# Patient Record
Sex: MALE | Race: BLACK OR AFRICAN AMERICAN | NOT HISPANIC OR LATINO | Employment: FULL TIME | URBAN - METROPOLITAN AREA
[De-identification: names, ages, dates, MRNs, and addresses within clinical notes are randomized per-mention and may not be internally consistent; named-entity substitution may affect disease eponyms.]

---

## 2022-05-25 DIAGNOSIS — R20.2 PARESTHESIA: Primary | ICD-10-CM

## 2023-09-10 ENCOUNTER — HOSPITAL ENCOUNTER (EMERGENCY)
Facility: HOSPITAL | Age: 28
Discharge: HOME | End: 2023-09-10
Attending: INTERNAL MEDICINE

## 2023-09-10 VITALS
DIASTOLIC BLOOD PRESSURE: 98 MMHG | OXYGEN SATURATION: 98 % | SYSTOLIC BLOOD PRESSURE: 157 MMHG | BODY MASS INDEX: 30.78 KG/M2 | HEART RATE: 65 BPM | TEMPERATURE: 98 F | RESPIRATION RATE: 14 BRPM | WEIGHT: 215 LBS | HEIGHT: 70 IN

## 2023-09-10 DIAGNOSIS — N34.2 URETHRITIS: Primary | ICD-10-CM

## 2023-09-10 LAB
APPEARANCE UR: CLEAR
BACTERIA #/AREA URNS AUTO: NORMAL /HPF
BILIRUB UR QL STRIP.AUTO: NEGATIVE
C TRACH DNA SPEC QL NAA+PROBE: NOT DETECTED
COLOR UR: YELLOW
GLUCOSE UR QL STRIP.AUTO: NEGATIVE
KETONES UR QL STRIP.AUTO: NEGATIVE
LEUKOCYTE ESTERASE UR QL STRIP.AUTO: ABNORMAL
N GONORRHOEA DNA SPEC QL NAA+PROBE: DETECTED
NITRITE UR QL STRIP.AUTO: NEGATIVE
PH UR STRIP.AUTO: 8.5 [PH]
PROT UR QL STRIP.AUTO: NEGATIVE
RBC #/AREA URNS AUTO: NORMAL /HPF
RBC UR QL AUTO: NEGATIVE
SOURCE (OHS): ABNORMAL
SP GR UR STRIP.AUTO: 1.02 (ref 1–1.03)
SQUAMOUS #/AREA URNS AUTO: NORMAL /HPF
UROBILINOGEN UR STRIP-ACNC: 1
WBC #/AREA URNS AUTO: NORMAL /HPF

## 2023-09-10 PROCEDURE — 81001 URINALYSIS AUTO W/SCOPE: CPT | Performed by: INTERNAL MEDICINE

## 2023-09-10 PROCEDURE — 87491 CHLMYD TRACH DNA AMP PROBE: CPT | Performed by: INTERNAL MEDICINE

## 2023-09-10 PROCEDURE — 99284 EMERGENCY DEPT VISIT MOD MDM: CPT

## 2023-09-10 PROCEDURE — 87591 N.GONORRHOEAE DNA AMP PROB: CPT | Performed by: INTERNAL MEDICINE

## 2023-09-10 PROCEDURE — 96372 THER/PROPH/DIAG INJ SC/IM: CPT | Performed by: INTERNAL MEDICINE

## 2023-09-10 PROCEDURE — 63600175 PHARM REV CODE 636 W HCPCS: Performed by: INTERNAL MEDICINE

## 2023-09-10 RX ORDER — DOXYCYCLINE 100 MG/1
100 CAPSULE ORAL EVERY 12 HOURS
Qty: 10 CAPSULE | Refills: 0 | Status: SHIPPED | OUTPATIENT
Start: 2023-09-10 | End: 2023-09-20

## 2023-09-10 RX ORDER — CEFTRIAXONE 1 G/1
1 INJECTION, POWDER, FOR SOLUTION INTRAMUSCULAR; INTRAVENOUS
Status: COMPLETED | OUTPATIENT
Start: 2023-09-10 | End: 2023-09-10

## 2023-09-10 RX ORDER — DOXYCYCLINE 100 MG/1
100 CAPSULE ORAL EVERY 12 HOURS
Qty: 20 CAPSULE | Refills: 0 | Status: SHIPPED | OUTPATIENT
Start: 2023-09-10 | End: 2023-09-10 | Stop reason: SDUPTHER

## 2023-09-10 RX ADMIN — CEFTRIAXONE SODIUM 1 G: 1 INJECTION, POWDER, FOR SOLUTION INTRAMUSCULAR; INTRAVENOUS at 11:09

## 2023-09-10 NOTE — ED PROVIDER NOTES
Encounter Date: 9/10/2023  History from patient     History     Chief Complaint   Patient presents with    Exposure to STD     Possible  STD exposure      c/o penile discharge for 2 days     HPI    28 y.o. male  has no past medical history on file.  Exposure to STD (Possible  STD exposure      c/o penile discharge for 2 days)     Review of patient's allergies indicates:  No Known Allergies  No past medical history on file.  No past surgical history on file.  No family history on file.     Review of Systems   HENT:  Negative for trouble swallowing and voice change.    Eyes:  Negative for visual disturbance.   Respiratory:  Negative for cough and shortness of breath.    Cardiovascular:  Negative for chest pain.   Gastrointestinal:  Negative for abdominal pain, diarrhea and vomiting.   Genitourinary:  Positive for penile discharge. Negative for dysuria and hematuria.   Musculoskeletal:  Negative for gait problem.        No Pain.   Skin:  Negative for color change and rash.   Neurological:  Negative for headaches.   Psychiatric/Behavioral:  Negative for behavioral problems and sleep disturbance.    All other systems reviewed and are negative.      Physical Exam     Initial Vitals [09/10/23 1047]   BP Pulse Resp Temp SpO2   (!) 157/98 65 14 98 °F (36.7 °C) 98 %      MAP       --         Physical Exam    Nursing note and vitals reviewed.  Constitutional: He appears well-developed and well-nourished. No distress.   HENT:   Head: Atraumatic.   Eyes: EOM are normal.   Cardiovascular:  Normal rate, regular rhythm and normal heart sounds.           Pulmonary/Chest: Breath sounds normal.   Abdominal: Abdomen is soft. Bowel sounds are normal.   Musculoskeletal:         General: Normal range of motion.      Cervical back: No bony tenderness.     Neurological: He is alert.   Speech Normal   Skin: Skin is dry.   Psychiatric: He has a normal mood and affect.   Pleasant         ED Course   Procedures  Orders Placed This Encounter    Procedures    Chlamydia/GC, PCR    Urinalysis, Reflex to Urine Culture    Urinalysis, Microscopic     Medications   cefTRIAXone injection 1 g (1 g Intramuscular Given 9/10/23 1107)           Labs Reviewed   URINALYSIS, REFLEX TO URINE CULTURE - Abnormal; Notable for the following components:       Result Value    Leukocyte Esterase, UA Trace (*)     All other components within normal limits   URINALYSIS, MICROSCOPIC - Normal   CHLAMYDIA/GONORRHOEAE(GC), PCR          Imaging Results    None          Medications   cefTRIAXone injection 1 g (1 g Intramuscular Given 9/10/23 1107)     Medical Decision Making    28 y.o. male  has no past medical history on file.  Exposure to STD (Possible  STD exposure      c/o penile discharge for 2 days)         Amount and/or Complexity of Data Reviewed  Labs: ordered. Decision-making details documented in ED Course.    Risk  Prescription drug management.                               Clinical Impression:   Final diagnoses:  [N34.2] Urethritis (Primary)        ED Disposition Condition    Discharge Stable          ED Prescriptions       Medication Sig Dispense Start Date End Date Auth. Provider    doxycycline (VIBRAMYCIN) 100 MG Cap  (Status: Discontinued) Take 1 capsule (100 mg total) by mouth every 12 (twelve) hours. for 10 days 20 capsule 9/10/2023 9/10/2023 Bebo Peterson MD    doxycycline (VIBRAMYCIN) 100 MG Cap Take 1 capsule (100 mg total) by mouth every 12 (twelve) hours. for 10 days 10 capsule 9/10/2023 9/20/2023 Bebo Peterson MD          Follow-up Information       Follow up With Specialties Details Why Contact Info    PMD  In 2 days               Bebo Peterson MD  09/10/23 5344

## 2023-09-10 NOTE — DISCHARGE INSTRUCTIONS
Sexually Transmitted Disease  [address] [address]      Sexually transmitted disease (STD) refers to any infection that is passed from person to person during sexual activity. This may happen by way of saliva, semen, blood, vaginal mucus, or urine. Common STDs include:     Gonorrhea.      Chlamydia.      Syphilis.      HIV/AIDS.      Genital herpes.      Hepatitis B and C.      Trichomonas.      Human papillomavirus (HPV).      Pubic lice.      CAUSES   An STD may be spread by bacteria, virus, or parasite. A person can get an STD by:     Sexual intercourse with an infected person.      Sharing sex toys with an infected person.      Sharing needles with an infected person.      Having intimate contact with the genitals, mouth, or rectal areas of an infected person.      SYMPTOMS   Some people may not have any symptoms, but they can still pass the infection to others. Different STDs have different symptoms. Symptoms include:     Painful or bloody urination.      Pain in the pelvis, abdomen, vagina, anus, throat, or eyes.       Skin rash, itching, irritation, growths, or sores (lesions ). These usually occur in the genital or anal area.      Abnormal vaginal discharge.      Penile discharge in men.      Soft, flesh-colored skin growths in the genital or anal area.      Fever.      Pain or bleeding during sexual intercourse.      Swollen glands in the groin area.      Yellow skin and eyes (jaundice ). This is seen with hepatitis.      DIAGNOSIS   To make a diagnosis, your caregiver may:     Take a medical history.      Perform a physical exam.      Take a specimen (culture ) to be examined.      Examine a sample of discharge under a microscope.      Perform blood tests.      Perform a Pap test, if this applies.       Perform a colposcopy.       Perform a laparoscopy.      TREATMENT     Chlamydia, gonorrhea, trichomonas, and syphilis can be cured with antibiotic medicine.       Genital herpes, hepatitis, and HIV can be treated, but not cured, with prescribed medicines. The medicines will lessen the symptoms.       Genital warts from HPV can be treated with medicine or by freezing, burning (electrocautery ), or surgery. Warts may come back.       HPV is a virus and cannot be cured with medicine or surgery. However, abnormal areas may be followed very closely by your caregiver and may be removed from the cervix, vagina, or vulva through office procedures or surgery.    If your diagnosis is confirmed, your recent sexual partners need treatment. This is true even if they are symptom-free or have a negative culture or evaluation. They should not have sex until their caregiver says it is okay.     HOME CARE INSTRUCTIONS     All sexual partners should be informed, tested, and treated for all STDs.      Take your antibiotics as directed. Finish them even if you start to feel better.       Only take over-the-counter or prescription medicines for pain, discomfort, or fever as directed by your caregiver.       Rest.      Eat a balanced diet and drink enough fluids to keep your urine clear or pale yellow.      Do not have sex until treatment is completed and you have followed up with your caregiver. STDs should be checked after treatment.      Keep all follow-up appointments, Pap tests, and blood tests as directed by your caregiver.       Only use latex condoms and water-soluble lubricants during sexual activity. Do not use petroleum jelly or oils.      Avoid alcohol and illegal drugs.      Get vaccinated for HPV and hepatitis. If you have not received these vaccines in the past, talk to your caregiver about whether one or both might be right for you.      Avoid risky sex practices that can break the skin.    The only way to avoid getting an STD is to avoid all sexual activity. Latex condoms and dental dams (for oral sex) will help lessen the risk of getting an STD, but will not completely eliminate  the risk.     SEEK MEDICAL CARE IF:     You have a fever.      You have any new or worsening symptoms.      Document Released: 03/09/2004 Document Revised: 03/11/2013 Document Reviewed: 03/16/2012  [employer]® Patient Information ©2014 [employer].           Take medicines as prescribed    See your family doctor in one to 2 days for further evaluation, workup, and treatment as necessary    Avoid driving or operating machinery while taking medicines as some medicines might cause drowsiness and may cause problems. Also pain medicines have potential of being addictive  so use Pain meds specially Narcotics Sparingly.    The exam and treatment you received in Emergency Room was for an urgent problem and NOT INTENDED AS COMPLETE CARE. It is important that you FOLLOW UP with a doctor for ongoing care. If your symptoms become WORSE or you DO NOT IMPROVE and you are unable to reach your health care provider, you should RETURN to the emergency department. The Emergency Room doctor has provided a PRELIMINARY INTERPRETATION of all your STUDIES. A final interpretation may be done after you are discharged. IF A CHANGE in your diagnosis or treatment is needed WE WILL CONTACT YOU. It is critical that we have a CURRENT PHONE NUMBER FOR YOU.

## 2025-05-14 ENCOUNTER — HOSPITAL ENCOUNTER (EMERGENCY)
Facility: HOSPITAL | Age: 30
Discharge: HOME | End: 2025-05-14
Attending: EMERGENCY MEDICINE

## 2025-05-14 VITALS
HEART RATE: 70 BPM | BODY MASS INDEX: 39.9 KG/M2 | TEMPERATURE: 97 F | SYSTOLIC BLOOD PRESSURE: 144 MMHG | HEIGHT: 71 IN | RESPIRATION RATE: 20 BRPM | DIASTOLIC BLOOD PRESSURE: 79 MMHG | WEIGHT: 285 LBS | OXYGEN SATURATION: 100 %

## 2025-05-14 DIAGNOSIS — K29.00 ACUTE GASTRITIS WITHOUT HEMORRHAGE, UNSPECIFIED GASTRITIS TYPE: Primary | ICD-10-CM

## 2025-05-14 DIAGNOSIS — R10.13 EPIGASTRIC PAIN: ICD-10-CM

## 2025-05-14 LAB
ALBUMIN SERPL-MCNC: 3.9 G/DL (ref 3.5–5)
ALBUMIN/GLOB SERPL: 1.2 RATIO (ref 1.1–2)
ALP SERPL-CCNC: 60 UNIT/L (ref 40–150)
ALT SERPL-CCNC: 69 UNIT/L (ref 0–55)
ANION GAP SERPL CALC-SCNC: 8 MEQ/L
AST SERPL-CCNC: 78 UNIT/L (ref 11–45)
BASOPHILS # BLD AUTO: 0.02 X10(3)/MCL
BASOPHILS NFR BLD AUTO: 0.2 %
BILIRUB SERPL-MCNC: 0.4 MG/DL
BUN SERPL-MCNC: 17.9 MG/DL (ref 8.9–20.6)
CALCIUM SERPL-MCNC: 9.1 MG/DL (ref 8.4–10.2)
CHLORIDE SERPL-SCNC: 105 MMOL/L (ref 98–107)
CO2 SERPL-SCNC: 26 MMOL/L (ref 22–29)
CREAT SERPL-MCNC: 1.2 MG/DL (ref 0.72–1.25)
CREAT/UREA NIT SERPL: 15
EOSINOPHIL # BLD AUTO: 0.25 X10(3)/MCL (ref 0–0.9)
EOSINOPHIL NFR BLD AUTO: 2.8 %
ERYTHROCYTE [DISTWIDTH] IN BLOOD BY AUTOMATED COUNT: 13.2 % (ref 11.5–17)
ETHANOL SERPL-MCNC: <10 MG/DL
GFR SERPLBLD CREATININE-BSD FMLA CKD-EPI: >60 ML/MIN/1.73/M2
GLOBULIN SER-MCNC: 3.2 GM/DL (ref 2.4–3.5)
GLUCOSE SERPL-MCNC: 100 MG/DL (ref 74–100)
HAV IGM SERPL QL IA: NONREACTIVE
HBV CORE IGM SERPL QL IA: NONREACTIVE
HBV SURFACE AG SERPL QL IA: NONREACTIVE
HCT VFR BLD AUTO: 46.6 % (ref 42–52)
HCV AB SERPL QL IA: NONREACTIVE
HGB BLD-MCNC: 15.6 G/DL (ref 14–18)
IMM GRANULOCYTES # BLD AUTO: 0.02 X10(3)/MCL (ref 0–0.04)
IMM GRANULOCYTES NFR BLD AUTO: 0.2 %
LIPASE SERPL-CCNC: 17 U/L
LYMPHOCYTES # BLD AUTO: 2.24 X10(3)/MCL (ref 0.6–4.6)
LYMPHOCYTES NFR BLD AUTO: 25.1 %
MCH RBC QN AUTO: 30.8 PG (ref 27–31)
MCHC RBC AUTO-ENTMCNC: 33.5 G/DL (ref 33–36)
MCV RBC AUTO: 92.1 FL (ref 80–94)
MONOCYTES # BLD AUTO: 0.48 X10(3)/MCL (ref 0.1–1.3)
MONOCYTES NFR BLD AUTO: 5.4 %
NEUTROPHILS # BLD AUTO: 5.92 X10(3)/MCL (ref 2.1–9.2)
NEUTROPHILS NFR BLD AUTO: 66.3 %
NRBC BLD AUTO-RTO: 0 %
PLATELET # BLD AUTO: 200 X10(3)/MCL (ref 130–400)
PMV BLD AUTO: 10.1 FL (ref 7.4–10.4)
POTASSIUM SERPL-SCNC: 3.9 MMOL/L (ref 3.5–5.1)
PROT SERPL-MCNC: 7.1 GM/DL (ref 6.4–8.3)
RBC # BLD AUTO: 5.06 X10(6)/MCL (ref 4.7–6.1)
SODIUM SERPL-SCNC: 139 MMOL/L (ref 136–145)
WBC # BLD AUTO: 8.93 X10(3)/MCL (ref 4.5–11.5)

## 2025-05-14 PROCEDURE — 80074 ACUTE HEPATITIS PANEL: CPT | Performed by: EMERGENCY MEDICINE

## 2025-05-14 PROCEDURE — 83690 ASSAY OF LIPASE: CPT | Performed by: EMERGENCY MEDICINE

## 2025-05-14 PROCEDURE — 25000003 PHARM REV CODE 250: Performed by: EMERGENCY MEDICINE

## 2025-05-14 PROCEDURE — 80053 COMPREHEN METABOLIC PANEL: CPT | Performed by: EMERGENCY MEDICINE

## 2025-05-14 PROCEDURE — 85025 COMPLETE CBC W/AUTO DIFF WBC: CPT | Performed by: EMERGENCY MEDICINE

## 2025-05-14 PROCEDURE — 25500020 PHARM REV CODE 255: Performed by: EMERGENCY MEDICINE

## 2025-05-14 PROCEDURE — 99285 EMERGENCY DEPT VISIT HI MDM: CPT | Mod: 25

## 2025-05-14 PROCEDURE — 82077 ASSAY SPEC XCP UR&BREATH IA: CPT | Performed by: EMERGENCY MEDICINE

## 2025-05-14 RX ORDER — SUCRALFATE 1 G/10ML
1 SUSPENSION ORAL 4 TIMES DAILY
Qty: 200 ML | Refills: 0 | Status: SHIPPED | OUTPATIENT
Start: 2025-05-14 | End: 2025-05-19

## 2025-05-14 RX ORDER — ALUMINUM HYDROXIDE, MAGNESIUM HYDROXIDE, AND SIMETHICONE 1200; 120; 1200 MG/30ML; MG/30ML; MG/30ML
30 SUSPENSION ORAL ONCE
Status: COMPLETED | OUTPATIENT
Start: 2025-05-14 | End: 2025-05-14

## 2025-05-14 RX ORDER — FAMOTIDINE 20 MG/1
20 TABLET, FILM COATED ORAL
Status: COMPLETED | OUTPATIENT
Start: 2025-05-14 | End: 2025-05-14

## 2025-05-14 RX ORDER — OMEPRAZOLE 40 MG/1
40 CAPSULE, DELAYED RELEASE ORAL DAILY
Qty: 14 CAPSULE | Refills: 0 | Status: SHIPPED | OUTPATIENT
Start: 2025-05-14 | End: 2025-05-28

## 2025-05-14 RX ORDER — LIDOCAINE HYDROCHLORIDE 20 MG/ML
15 SOLUTION OROPHARYNGEAL ONCE
Status: COMPLETED | OUTPATIENT
Start: 2025-05-14 | End: 2025-05-14

## 2025-05-14 RX ORDER — ONDANSETRON 4 MG/1
4 TABLET, ORALLY DISINTEGRATING ORAL EVERY 6 HOURS PRN
Qty: 16 TABLET | Refills: 0 | Status: SHIPPED | OUTPATIENT
Start: 2025-05-14

## 2025-05-14 RX ADMIN — LIDOCAINE HYDROCHLORIDE 15 ML: 20 SOLUTION ORAL at 02:05

## 2025-05-14 RX ADMIN — FAMOTIDINE 20 MG: 20 TABLET, FILM COATED ORAL at 05:05

## 2025-05-14 RX ADMIN — IOHEXOL 100 ML: 350 INJECTION, SOLUTION INTRAVENOUS at 03:05

## 2025-05-14 RX ADMIN — ALUMINUM HYDROXIDE, MAGNESIUM HYDROXIDE, AND DIMETHICONE 30 ML: 200; 20; 200 SUSPENSION ORAL at 02:05

## 2025-05-14 NOTE — DISCHARGE INSTRUCTIONS
Follow a low acid low spice diet like we discussed.  Do not take any ibuprofen.  You can use acetaminophen for pain.  Take the medications as prescribed.  Follow up with the primary care doctor if you do not have 1 call the number above to get established.  If your symptoms change or worsen returned for further evaluation

## 2025-05-14 NOTE — ED PROVIDER NOTES
Encounter Date: 5/14/2025    SCRIBE #1 NOTE: I, Dianna Ocampo, am scribing for, and in the presence of,  Demetri Blackwell MD. I have scribed the following portions of the note - Other sections scribed: HPI, ROS, PE.       History     Chief Complaint   Patient presents with    Abdominal Pain     For months w/ intermittent abd pain - seen multiple times at [facility] dx w/ gastritis, this episode woke him from sleep tonight - points to pain epigastric area     Patient is a 30-year-old male with no past medical history presenting to the ED with c/o abdominal pain. The pt reports intermittent epigastric abdominal pain onset several months ago with an episode that woke him out of sleep last night. He denies any n/v/d. He states he was seen at [facility] two weeks ago for this complaint and was diagnosed with gastritis. He reports taking IBU frequently.     The history is provided by the patient. No  was used.     Review of patient's allergies indicates:  No Known Allergies  No past medical history on file.  No past surgical history on file.  No family history on file.  Social History[1]  Review of Systems   Gastrointestinal:  Positive for abdominal pain. Negative for diarrhea, nausea and vomiting.       Physical Exam     Initial Vitals [05/14/25 0026]   BP Pulse Resp Temp SpO2   (!) 153/96 81 16 97.4 °F (36.3 °C) 99 %      MAP       --         Physical Exam    Constitutional: He appears well-developed and well-nourished. No distress.   HENT:   Head: Normocephalic and atraumatic.   Cardiovascular:  Normal rate.           Pulmonary/Chest: No respiratory distress. He has no wheezes. He has no rhonchi. He exhibits no tenderness.   Abdominal: Abdomen is soft. He exhibits no distension. There is abdominal tenderness in the epigastric area. There is no rebound and no guarding.   Musculoskeletal:         General: Normal range of motion.     Neurological: He is alert and oriented to person, place, and time. He has normal  strength.   Skin: Skin is warm and dry.   Psychiatric: He has a normal mood and affect.         ED Course   Procedures  Labs Reviewed   COMPREHENSIVE METABOLIC PANEL - Abnormal       Result Value    Sodium 139      Potassium 3.9      Chloride 105      CO2 26      Glucose 100      Blood Urea Nitrogen 17.9      Creatinine 1.20      Calcium 9.1      Protein Total 7.1      Albumin 3.9      Globulin 3.2      Albumin/Globulin Ratio 1.2      Bilirubin Total 0.4      ALP 60      ALT 69 (*)     AST 78 (*)     eGFR >60      Anion Gap 8.0      BUN/Creatinine Ratio 15     LIPASE - Normal    Lipase Level 17     ALCOHOL,MEDICAL (ETHANOL) - Normal    Ethanol Level <10.0     CBC W/ AUTO DIFFERENTIAL    Narrative:     The following orders were created for panel order CBC auto differential.  Procedure                               Abnormality         Status                     ---------                               -----------         ------                     CBC with Differential[[id number]]                           Final result                 Please view results for these tests on the individual orders.   CBC WITH DIFFERENTIAL    WBC 8.93      RBC 5.06      Hgb 15.6      Hct 46.6      MCV 92.1      MCH 30.8      MCHC 33.5      RDW 13.2      Platelet 200      MPV 10.1      Neut % 66.3      Lymph % 25.1      Mono % 5.4      Eos % 2.8      Basophil % 0.2      Imm Grans % 0.2      Neut # 5.92      Lymph # 2.24      Mono # 0.48      Eos # 0.25      Baso # 0.02      Imm Gran # 0.02      NRBC% 0.0     HEPATITIS PANEL, ACUTE          Imaging Results              CT Abdomen Pelvis With IV Contrast NO Oral Contrast (Preliminary result)  Result time 05/14/25 03:32:35      Preliminary result by Zelalem Blas Jr., MD (05/14/25 03:32:35)                   Narrative:    START OF REPORT:  Technique: CT of the abdomen and pelvis was performed with axial images as well as sagittal and coronal reconstruction images with intravenous  contrast.    Comparison: None available.    Clinical History: Epigastric pain.    Dosage Information: Automated Exposure Control was utilized.    Findings:  Lines and Tubes: None.  Thorax:  Lungs: Minimal streaky opacity is present at the left lung base consistent with atelectasis.  Pleura: No effusions or thickening.  Heart: The heart size is within normal limits.  Abdomen:  Abdominal Wall: No abdominal wall pathology is seen.  Liver: Mild fatty infiltration of the liver is present. The liver otherwise appears unremarkable.  Biliary System: No intrahepatic or extrahepatic biliary duct dilatation is seen.  Gallbladder: The gallbladder appears unremarkable.  Pancreas: The pancreas appears unremarkable.  Spleen: The spleen appears unremarkable.  Adrenals: The adrenal glands appear unremarkable.  Kidneys: The kidneys appear unremarkable with no stones cysts masses or hydronephrosis.  Aorta: The abdominal aorta appears unremarkable.  IVC: Unremarkable.  Bowel:  Esophagus: The visualized esophagus appears unremarkable.  Stomach: The stomach appears unremarkable.  Duodenum: Unremarkable appearing duodenum.  Small Bowel: The small bowel appears unremarkable.  Colon: There is moderate stool in the colon which could reflect an element of constipation.  Appendix: The appendix appears unremarkable.  Peritoneum: No intraperitoneal free air or ascites is seen.    Pelvis:  Bladder: The bladder appears unremarkable.  Male:  Prostate gland: The prostate gland appears unremarkable.    Bony structures:  Dorsal Spine: The visualized dorsal spine appears unremarkable.  Bony Pelvis: The visualized bony structures of the pelvis appear unremarkable.      Impression:  1. No acute intraabdominal or pelvic solid organ or bowel pathology identified. Details and findings as discussed.                                         US Abdomen Limited (Preliminary result)  Result time 05/14/25 02:54:36   Procedure changed from US Abdomen  Limited_Gallbladder     Preliminary result by Zelalem Blas Jr., MD (05/14/25 02:54:36)                   Narrative:    START OF REPORT:  Technique: Limited right upper quadrant abdominal ultrasound was performed.    Comparison: None.    Clinical History: Epigastric pain.    Findings:  Liver: The liver is mildly prominent and measures 15.9 centimeters in greatest dimension and demonstrates normal echogenicity.  Biliary ducts: The common bile duct is within normal limits at 5.7 mm in diameter.  Gallbladder: The gall bladder could not be visualized.  Pancreas: The pancreas was obscured by bowel gas.  Right kidney:  Dimensions: The right kidney measures 11 cm in sagittal dimension and appears unremarkable.  Vascular:  Portal vein: Flow parameters are within normal limits with hepatopetal flow.  IVC: The IVC was obscured by bowel gas.      Impression:  1. No acute intraabdominal process identified on the submitted images. Details and findings as noted above.                                         Medications   famotidine tablet 20 mg (has no administration in time range)   aluminum-magnesium hydroxide-simethicone 200-200-20 mg/5 mL suspension 30 mL (30 mLs Oral Given 5/14/25 0204)     And   LIDOcaine viscous HCl 2% oral solution 15 mL (15 mLs Oral Given 5/14/25 0205)   iohexoL (OMNIPAQUE 350) injection 100 mL (100 mLs Intravenous Given 5/14/25 0311)     Medical Decision Making  Differential diagnosis include but are not limited to: gastritis, cholelithiasis, and pancreatitis.    Amount and/or Complexity of Data Reviewed  Labs: ordered.  Radiology: ordered and independent interpretation performed.    Risk  OTC drugs.  Prescription drug management.            Scribe Attestation:   Scribe #1: I performed the above scribed service and the documentation accurately describes the services I performed. I attest to the accuracy of the note.    Attending Attestation:           Physician Attestation for Scribe:  Physician  Attestation Statement for Scribe #1: I, Demetri Blackwell MD, reviewed documentation, as scribed by Dianna Ocampo in my presence, and it is both accurate and complete.             ED Course as of 05/14/25 0440   Wed May 14, 2025   0303 Region of ALT AST.  No leukocytosis no elevation of lipase [LF]   0303 Visualized gallbladder on ultrasound will get a CT for further evaluation whenever low suspicion of cholecystitis [LF]   0304 Multiple visits for epigastric pain no recent imaging.  Ultrasound unable to visualize gallbladder will get CT for further evaluation due to increase AST and alt hepatitis panel as well [LF]      ED Course User Index  [LF] Demetri Blackwell MD                           Clinical Impression:  Final diagnoses:  [K29.00] Acute gastritis without hemorrhage, unspecified gastritis type (Primary)  [R10.13] Epigastric pain          ED Disposition Condition    Discharge Stable          ED Prescriptions       Medication Sig Dispense Start Date End Date Auth. Provider    ondansetron (ZOFRAN-ODT) 4 MG TbDL Take 1 tablet (4 mg total) by mouth every 6 (six) hours as needed (nausea). 16 tablet 5/14/2025 -- Demetri Blackwell MD    sucralfate (CARAFATE) 100 mg/mL suspension Take 10 mLs (1 g total) by mouth 4 (four) times daily. for 5 days 200 mL 5/14/2025 5/19/2025 Demetri Blackwell MD    omeprazole (PRILOSEC) 40 MG capsule Take 1 capsule (40 mg total) by mouth once daily. for 14 days 14 capsule 5/14/2025 5/28/2025 Demetri Blackwell MD          Follow-up Information       Follow up With Specialties Details Why Contact Info    Primary care provider  Schedule an appointment as soon as possible for a visit  You can call [phone] to get set up with a local primary care provider within the next few days.If your symptoms worsen or change please return to the emergency department for re-evaluation Call your primary care provider to schedule a follow-up appointment within a week               [1]          Demetri Blackwell MD  05/14/25 3105

## 2025-05-14 NOTE — Clinical Note
"Shorty "Al;mira" Christo was seen and treated in our emergency department on 5/14/2025.  He may return to work on 05/15/2025.       If you have any questions or concerns, please don't hesitate to call.      Neetu MITCHELL    "

## 2025-05-16 LAB — PATH REV: NORMAL

## 2025-07-27 ENCOUNTER — HOSPITAL ENCOUNTER (EMERGENCY)
Facility: HOSPITAL | Age: 30
Discharge: HOME | End: 2025-07-27
Attending: FAMILY MEDICINE

## 2025-07-27 VITALS
HEIGHT: 71 IN | WEIGHT: 280.81 LBS | HEART RATE: 57 BPM | TEMPERATURE: 98 F | SYSTOLIC BLOOD PRESSURE: 123 MMHG | DIASTOLIC BLOOD PRESSURE: 81 MMHG | OXYGEN SATURATION: 100 % | RESPIRATION RATE: 17 BRPM | BODY MASS INDEX: 39.31 KG/M2

## 2025-07-27 DIAGNOSIS — K29.70 GASTRITIS, PRESENCE OF BLEEDING UNSPECIFIED, UNSPECIFIED CHRONICITY, UNSPECIFIED GASTRITIS TYPE: Primary | ICD-10-CM

## 2025-07-27 LAB
ALBUMIN SERPL-MCNC: 3.9 G/DL (ref 3.5–5)
ALBUMIN/GLOB SERPL: 1.1 RATIO (ref 1.1–2)
ALP SERPL-CCNC: 54 UNIT/L (ref 40–150)
ALT SERPL-CCNC: 27 UNIT/L (ref 0–55)
ANION GAP SERPL CALC-SCNC: 10 MEQ/L
AST SERPL-CCNC: 23 UNIT/L (ref 11–45)
BASOPHILS # BLD AUTO: 0.01 X10(3)/MCL
BASOPHILS NFR BLD AUTO: 0.1 %
BILIRUB SERPL-MCNC: 0.3 MG/DL
BUN SERPL-MCNC: 13.8 MG/DL (ref 8.9–20.6)
CALCIUM SERPL-MCNC: 9.4 MG/DL (ref 8.4–10.2)
CHLORIDE SERPL-SCNC: 105 MMOL/L (ref 98–107)
CO2 SERPL-SCNC: 25 MMOL/L (ref 22–29)
CREAT SERPL-MCNC: 1.25 MG/DL (ref 0.72–1.25)
CREAT/UREA NIT SERPL: 11
EOSINOPHIL # BLD AUTO: 0.21 X10(3)/MCL (ref 0–0.9)
EOSINOPHIL NFR BLD AUTO: 2.7 %
ERYTHROCYTE [DISTWIDTH] IN BLOOD BY AUTOMATED COUNT: 13.2 % (ref 11.5–17)
GFR SERPLBLD CREATININE-BSD FMLA CKD-EPI: >60 ML/MIN/1.73/M2
GLOBULIN SER-MCNC: 3.6 GM/DL (ref 2.4–3.5)
GLUCOSE SERPL-MCNC: 84 MG/DL (ref 74–100)
HCT VFR BLD AUTO: 45.3 % (ref 42–52)
HGB BLD-MCNC: 15.4 G/DL (ref 14–18)
IMM GRANULOCYTES # BLD AUTO: 0.01 X10(3)/MCL (ref 0–0.04)
IMM GRANULOCYTES NFR BLD AUTO: 0.1 %
LYMPHOCYTES # BLD AUTO: 2.45 X10(3)/MCL (ref 0.6–4.6)
LYMPHOCYTES NFR BLD AUTO: 31.8 %
MCH RBC QN AUTO: 31.3 PG (ref 27–31)
MCHC RBC AUTO-ENTMCNC: 34 G/DL (ref 33–36)
MCV RBC AUTO: 92.1 FL (ref 80–94)
MONOCYTES # BLD AUTO: 0.41 X10(3)/MCL (ref 0.1–1.3)
MONOCYTES NFR BLD AUTO: 5.3 %
NEUTROPHILS # BLD AUTO: 4.61 X10(3)/MCL (ref 2.1–9.2)
NEUTROPHILS NFR BLD AUTO: 60 %
NRBC BLD AUTO-RTO: 0 %
PLATELET # BLD AUTO: 222 X10(3)/MCL (ref 130–400)
PMV BLD AUTO: 10.4 FL (ref 7.4–10.4)
POTASSIUM SERPL-SCNC: 4.4 MMOL/L (ref 3.5–5.1)
PROT SERPL-MCNC: 7.5 GM/DL (ref 6.4–8.3)
RBC # BLD AUTO: 4.92 X10(6)/MCL (ref 4.7–6.1)
SODIUM SERPL-SCNC: 140 MMOL/L (ref 136–145)
WBC # BLD AUTO: 7.7 X10(3)/MCL (ref 4.5–11.5)

## 2025-07-27 PROCEDURE — 99283 EMERGENCY DEPT VISIT LOW MDM: CPT

## 2025-07-27 PROCEDURE — 85025 COMPLETE CBC W/AUTO DIFF WBC: CPT

## 2025-07-27 PROCEDURE — 80053 COMPREHEN METABOLIC PANEL: CPT

## 2025-07-27 PROCEDURE — 25000003 PHARM REV CODE 250

## 2025-07-27 RX ORDER — OMEPRAZOLE 20 MG/1
20 CAPSULE, DELAYED RELEASE ORAL
Qty: 30 CAPSULE | Refills: 0 | Status: SHIPPED | OUTPATIENT
Start: 2025-07-27 | End: 2025-08-26

## 2025-07-27 RX ORDER — FAMOTIDINE 20 MG/1
20 TABLET, FILM COATED ORAL
Status: COMPLETED | OUTPATIENT
Start: 2025-07-27 | End: 2025-07-27

## 2025-07-27 RX ORDER — OMEPRAZOLE 20 MG/1
20 CAPSULE, DELAYED RELEASE ORAL
Qty: 30 CAPSULE | Refills: 0 | Status: SHIPPED | OUTPATIENT
Start: 2025-07-27 | End: 2025-07-27

## 2025-07-27 RX ORDER — ALUMINUM HYDROXIDE, MAGNESIUM HYDROXIDE, AND SIMETHICONE 1200; 120; 1200 MG/30ML; MG/30ML; MG/30ML
30 SUSPENSION ORAL ONCE
Status: COMPLETED | OUTPATIENT
Start: 2025-07-27 | End: 2025-07-27

## 2025-07-27 RX ORDER — LIDOCAINE HYDROCHLORIDE 20 MG/ML
15 SOLUTION OROPHARYNGEAL ONCE
Status: COMPLETED | OUTPATIENT
Start: 2025-07-27 | End: 2025-07-27

## 2025-07-27 RX ADMIN — LIDOCAINE HYDROCHLORIDE 15 ML: 20 SOLUTION ORAL at 09:07

## 2025-07-27 RX ADMIN — ALUMINUM HYDROXIDE, MAGNESIUM HYDROXIDE, AND SIMETHICONE 30 ML: 200; 200; 20 SUSPENSION ORAL at 09:07

## 2025-07-27 RX ADMIN — FAMOTIDINE 20 MG: 20 TABLET, FILM COATED ORAL at 09:07

## 2025-07-28 LAB
HOLD SPECIMEN: NORMAL
HOLD SPECIMEN: NORMAL

## 2025-07-28 NOTE — ED PROVIDER NOTES
Encounter Date: 7/27/2025       History     Chief Complaint   Patient presents with    Abdominal Pain     Pt c/o burning, epigastric pain x1 week. Was seen at [facility] x2 months prior and was given GI cocktail and meds but states the pain is much worse now. Denies NVD     A 30 y.o. male patient with a history of no known medical problems presents to the ED with epigastric burning and aching. The onset is 1 week ago for this episode. Patient states he had a similar episode about 1 month ago and took omeprazole for 2 weeks as prescribed and it improved. Patient states now the pain returns. Patient does not have PCP. Denies fever, chills, N/V, diarrhea, constipation, SOB, CP.       The history is provided by the patient.     Review of patient's allergies indicates:  No Known Allergies  No past medical history on file.  No past surgical history on file.  No family history on file.  Social History[1]  Review of Systems   Constitutional:  Negative for chills and fever.   Eyes:  Negative for visual disturbance.   Respiratory:  Negative for shortness of breath.    Cardiovascular:  Negative for chest pain.   Gastrointestinal:  Positive for abdominal pain. Negative for abdominal distention, constipation, diarrhea, nausea and vomiting.   Genitourinary:  Negative for difficulty urinating and dysuria.   Musculoskeletal:  Negative for arthralgias.   Skin:  Negative for color change and rash.   Neurological:  Negative for weakness and headaches.   Hematological:  Does not bruise/bleed easily.   Psychiatric/Behavioral:  Negative for confusion.    All other systems reviewed and are negative.      Physical Exam     Initial Vitals [07/27/25 2018]   BP Pulse Resp Temp SpO2   134/80 66 16 97.7 °F (36.5 °C) 98 %      MAP       --         Physical Exam    Nursing note and vitals reviewed.  Constitutional: He appears well-developed and well-nourished.   HENT:   Head: Normocephalic and atraumatic.   Right Ear: External ear normal.   Left Ear:  External ear normal.   Nose: Nose normal.   Eyes: Conjunctivae and EOM are normal.   Neck: Neck supple.   Cardiovascular:  Normal rate, regular rhythm and normal heart sounds.     Exam reveals no gallop and no friction rub.       No murmur heard.  Pulmonary/Chest: Breath sounds normal. No respiratory distress. He has no wheezes. He has no rhonchi. He has no rales.   Abdominal: Abdomen is soft. Bowel sounds are normal. He exhibits no distension and no mass. There is abdominal tenderness in the epigastric area.   No right CVA tenderness.  No left CVA tenderness. There is no rebound and no guarding.   Musculoskeletal:      Cervical back: Neck supple.     Neurological: He is alert and oriented to person, place, and time. GCS eye subscore is 4. GCS verbal subscore is 5. GCS motor subscore is 6.   Skin: Skin is warm and dry. Capillary refill takes less than 2 seconds.         ED Course   Procedures  Labs Reviewed   COMPREHENSIVE METABOLIC PANEL - Abnormal       Result Value    Sodium 140      Potassium 4.4      Chloride 105      CO2 25      Glucose 84      Blood Urea Nitrogen 13.8      Creatinine 1.25      Calcium 9.4      Protein Total 7.5      Albumin 3.9      Globulin 3.6 (*)     Albumin/Globulin Ratio 1.1      Bilirubin Total 0.3      ALP 54      ALT 27      AST 23      eGFR >60      Anion Gap 10.0      BUN/Creatinine Ratio 11     CBC WITH DIFFERENTIAL - Abnormal    WBC 7.70      RBC 4.92      Hgb 15.4      Hct 45.3      MCV 92.1      MCH 31.3 (*)     MCHC 34.0      RDW 13.2      Platelet 222      MPV 10.4      Neut % 60.0      Lymph % 31.8      Mono % 5.3      Eos % 2.7      Basophil % 0.1      Imm Grans % 0.1      Neut # 4.61      Lymph # 2.45      Mono # 0.41      Eos # 0.21      Baso # 0.01      Imm Gran # 0.01      NRBC% 0.0     CBC W/ AUTO DIFFERENTIAL    Narrative:     The following orders were created for panel order CBC Auto Differential.  Procedure                               Abnormality         Status                      ---------                               -----------         ------                     CBC with Differential[[id number]]       Abnormal            Final result                 Please view results for these tests on the individual orders.   EXTRA TUBES    Narrative:     The following orders were created for panel order EXTRA TUBES.  Procedure                               Abnormality         Status                     ---------                               -----------         ------                     Light Blue Top Hold[[id number]]                             In process                 Gold Top Hold[[id number]]                                   In process                   Please view results for these tests on the individual orders.   LIGHT BLUE TOP HOLD   GOLD TOP HOLD          Imaging Results    None          Medications   famotidine tablet 20 mg (20 mg Oral Given 7/27/25 2148)   aluminum-magnesium hydroxide-simethicone 200-200-20 mg/5 mL suspension 30 mL (30 mLs Oral Given 7/27/25 2148)     And   LIDOcaine viscous HCl 2% oral solution 15 mL (15 mLs Oral Given 7/27/25 2148)     Medical Decision Making  A 30 y.o. male patient with a history of no known medical problems presents to the ED with epigastric burning and aching. The onset is 1 week ago for this episode. Patient states he had a similar episode about 1 month ago and took omeprazole for 2 weeks as prescribed and it improved. Patient states now the pain returns. Patient does not have PCP. Denies fever, chills, N/V, diarrhea, constipation, SOB, CP.     Labs unremarkable.     Patient's condition improved with the following Medications  famotidine tablet 20 mg (20 mg Oral Given 7/27/25 2148)  aluminum-magnesium hydroxide-simethicone 200-200-20 mg/5 mL suspension 30 mL (30 mLs Oral Given 7/27/25 2148)    And  LIDOcaine viscous HCl 2% oral solution 15 mL (15 mLs Oral Given 7/27/25 2148)    Clinical impression:  Gastritis, presence of bleeding  unspecified, unspecified chronicity, unspecified gastritis type (Primary)    Patient is non-toxic appearing and tolerating nutritional intake. Patient's vital signs and clinical condition are stable for DC with ED Prescriptions     Medication Sig Dispense Start Date End Date Auth. Rd    omeprazole (PRILOSEC) 20 MG capsule  (Status: Discontinued) Take 1   capsule (20 mg total) by mouth before breakfast. 30 capsule 7/27/2025 7/27/2025 Amada Campbell PA    bismuth subsalicylate (DIOTAME INSTYDOSE) 524 mg/30 mL suspension packet    (Status: Discontinued) Take 30 mLs (524 mg total) by mouth 2 (two) times   daily as needed for Indigestion. 600 mL 7/27/2025 7/27/2025 Amada Campbell PA    bismuth subsalicylate (DIOTAME INSTYDOSE) 524 mg/30 mL suspension packet   Take 30 mLs (524 mg total) by mouth 2 (two) times daily as needed for   Indigestion. 600 mL 7/27/2025 8/6/2025 Amada Campbell PA    omeprazole (PRILOSEC) 20 MG capsule Take 1 capsule (20 mg total) by mouth   before breakfast. 30 capsule 7/27/2025 8/26/2025 Amada Campbell PA         Follow-up: PCP or Internal medicine clinic within 3 days  Referrals made: internal medicine for PCP    Strict follow-up precautions given. Patient verbalizes understanding of treatment plan and ED return precautions.       Amount and/or Complexity of Data Reviewed  Labs: ordered. Decision-making details documented in ED Course.    Risk  OTC drugs.  Prescription drug management.  Risk Details: Given strict ED return precautions. I have spoken with the patient and/or caregivers. I have explained the patient's condition, diagnoses and treatment plan based on the information available to me at this time. I have answered the patient's and/or caregiver's questions and addressed any concerns. The patient and/or caregivers have as good an understanding of the patient's diagnosis, condition and treatment plan as can be expected at this point. The patient's condition is  stable and appropriate for discharge from the emergency department.      The patient will pursue further outpatient evaluation with the primary care physician or other designated or consulting physician as outlined in the discharge instructions. The patient and/or caregivers are agreeable to this plan of care and follow-up instructions have been explained in detail. The patient and/or caregivers have received these instructions in written format and have expressed an understanding of the discharge instructions. The patient and/or caregivers are aware that any significant change in condition or worsening of symptoms should prompt an immediate return to this or the closest emergency department or a call to 911.               Additional MDM:   Differential Diagnosis:   Other: The following diagnoses were also considered and will be evaluated: GERD, PUD and Viral syndrome.            ED Course as of 07/27/25 2258   Sun Jul 27, 2025 2221 WBC: 7.70 [AG]   2221 Hemoglobin: 15.4 [AG]   2244 eGFR: >60 [AG]      ED Course User Index  [AG] Amada Campbell PA                               Clinical Impression:  Final diagnoses:  [K29.70] Gastritis, presence of bleeding unspecified, unspecified chronicity, unspecified gastritis type (Primary)          ED Disposition Condition    Discharge Stable          ED Prescriptions       Medication Sig Dispense Start Date End Date Auth. Provider    omeprazole (PRILOSEC) 20 MG capsule  (Status: Discontinued) Take 1 capsule (20 mg total) by mouth before breakfast. 30 capsule 7/27/2025 7/27/2025 Amada Campbell PA    bismuth subsalicylate (DIOTAME INSTYDOSE) 524 mg/30 mL suspension packet  (Status: Discontinued) Take 30 mLs (524 mg total) by mouth 2 (two) times daily as needed for Indigestion. 600 mL 7/27/2025 7/27/2025 Amada Campbell PA    bismuth subsalicylate (DIOTAME INSTYDOSE) 524 mg/30 mL suspension packet Take 30 mLs (524 mg total) by mouth 2 (two) times daily as needed for  Indigestion. 600 mL 7/27/2025 8/6/2025 Amada Campbell PA    omeprazole (PRILOSEC) 20 MG capsule Take 1 capsule (20 mg total) by mouth before breakfast. 30 capsule 7/27/2025 8/26/2025 Amada Campbell PA          Follow-up Information       Follow up With Specialties Details Why Contact Info Additional Information    [facility] Emergency [facility] Emergency Medicine Go to  If symptoms worsen, As needed [address] [facility] [address] [phone]     [facility] Internal Medicine Internal Medicine Call in 3 days  [address] [facility] [place] [address] [phone] Internal Medicine Clinic Entrance #1                   [1]         Amada Campbell PA  07/27/25 2259     no